# Patient Record
Sex: FEMALE | Race: WHITE | HISPANIC OR LATINO | URBAN - METROPOLITAN AREA
[De-identification: names, ages, dates, MRNs, and addresses within clinical notes are randomized per-mention and may not be internally consistent; named-entity substitution may affect disease eponyms.]

---

## 2017-01-31 ENCOUNTER — EMERGENCY (EMERGENCY)
Facility: HOSPITAL | Age: 27
LOS: 1 days | Discharge: HOME | End: 2017-01-31
Attending: EMERGENCY MEDICINE
Payer: COMMERCIAL

## 2017-01-31 VITALS
SYSTOLIC BLOOD PRESSURE: 123 MMHG | RESPIRATION RATE: 16 BRPM | HEART RATE: 96 BPM | TEMPERATURE: 98 F | DIASTOLIC BLOOD PRESSURE: 79 MMHG | WEIGHT: 164.91 LBS | HEIGHT: 68 IN | OXYGEN SATURATION: 97 %

## 2017-01-31 DIAGNOSIS — Z88.0 ALLERGY STATUS TO PENICILLIN: ICD-10-CM

## 2017-01-31 DIAGNOSIS — Z88.8 ALLERGY STATUS TO OTHER DRUGS, MEDICAMENTS AND BIOLOGICAL SUBSTANCES STATUS: ICD-10-CM

## 2017-01-31 DIAGNOSIS — J11.1 INFLUENZA DUE TO UNIDENTIFIED INFLUENZA VIRUS WITH OTHER RESPIRATORY MANIFESTATIONS: ICD-10-CM

## 2017-01-31 DIAGNOSIS — Z98.82 BREAST IMPLANT STATUS: Chronic | ICD-10-CM

## 2017-01-31 DIAGNOSIS — Z98.82 BREAST IMPLANT STATUS: ICD-10-CM

## 2017-01-31 DIAGNOSIS — M54.6 PAIN IN THORACIC SPINE: ICD-10-CM

## 2017-01-31 PROCEDURE — 71046 X-RAY EXAM CHEST 2 VIEWS: CPT

## 2017-01-31 PROCEDURE — 96372 THER/PROPH/DIAG INJ SC/IM: CPT

## 2017-01-31 PROCEDURE — 93005 ELECTROCARDIOGRAM TRACING: CPT

## 2017-01-31 PROCEDURE — 99283 EMERGENCY DEPT VISIT LOW MDM: CPT | Mod: 25

## 2017-01-31 PROCEDURE — 99284 EMERGENCY DEPT VISIT MOD MDM: CPT

## 2017-01-31 PROCEDURE — 71020: CPT | Mod: 26

## 2017-01-31 RX ORDER — KETOROLAC TROMETHAMINE 30 MG/ML
15 SYRINGE (ML) INJECTION ONCE
Qty: 0 | Refills: 0 | Status: DISCONTINUED | OUTPATIENT
Start: 2017-01-31 | End: 2017-01-31

## 2017-01-31 RX ADMIN — Medication 15 MILLIGRAM(S): at 22:51

## 2017-01-31 RX ADMIN — Medication 15 MILLIGRAM(S): at 21:53

## 2017-01-31 NOTE — ED PROVIDER NOTE - MEDICAL DECISION MAKING DETAILS
PERC negative. Pt w/ chest pain, back pain, cough. Not hypoxic. Evaluate for PNA. Likely MSK related to coughing. Exam not consistent w/ pneumothorax or aortic dissection. PERC negative. Pt w/ chest pain, back pain, cough. Not hypoxic. Evaluate for PNA. Likely MSK related to coughing. Exam not consistent w/ pneumothorax or aortic dissection. Pt advised of qtc, instructed to f/u with her pmd for repeat ecg, instructed to avoid medications which prolong qt. PERC negative. Pt w/ chest pain, back pain, cough. Not hypoxic. Evaluate for PNA. Likely MSK related to coughing. Exam not consistent w/ pneumothorax or aortic dissection. Pt advised of qtc, instructed to f/u with her pmd for repeat ecg, instructed to avoid medications which prolong qt. CXR normal, pt reassessed, feeling better at 10:15pm, calm, no tachypnea, talking in full sentences. I recommneded d/c tamiflu as she started 4 days after sx onset and had side effects, recommneded rest, motrin for pain and oral hydration. Pt instructed to return for difficutly breathing, persistent fever or any other concerning symptoms.

## 2017-01-31 NOTE — ED ADULT TRIAGE NOTE - CHIEF COMPLAINT QUOTE
I have cough, fever, chest congestion not relieved by prescribed medication.  I was diagnosed with the flu yesterday

## 2017-01-31 NOTE — ED PROVIDER NOTE - NS ED MD SCRIBE ATTENDING SCRIBE SECTIONS
HISTORY OF PRESENT ILLNESS/PAST MEDICAL/SURGICAL/SOCIAL HISTORY/HIV/DISPOSITION/VITAL SIGNS( Pullset)/PHYSICAL EXAM/REVIEW OF SYSTEMS

## 2017-01-31 NOTE — ED PROVIDER NOTE - OBJECTIVE STATEMENT
27 y/o F w/ PMHx of ADHD p/w cough onset 4 days ago. Pt went to urgent care yesterday w/ neg X-Ray and was Dx w/ flu. Pt was given Tamiflu and Dayquil today. After taking meds at 17:00, pt began experiencing chest pain, back pain and nausea. Pt denies pregnancy, recent travel, or any other complaint. Allergies: penicillin, Reglan. Pt's flu shot is UTD. Pt has copper IUD in place.

## 2017-01-31 NOTE — ED PROVIDER NOTE - CARE PLAN
Principal Discharge DX:	Acute thoracic back pain, unspecified back pain laterality Principal Discharge DX:	Acute thoracic back pain, unspecified back pain laterality  Secondary Diagnosis:	Influenza

## 2017-02-02 ENCOUNTER — TRANSCRIPTION ENCOUNTER (OUTPATIENT)
Age: 27
End: 2017-02-02

## 2017-06-01 ENCOUNTER — OUTPATIENT (OUTPATIENT)
Dept: OUTPATIENT SERVICES | Facility: HOSPITAL | Age: 27
LOS: 1 days | End: 2017-06-01
Payer: MEDICAID

## 2017-06-01 DIAGNOSIS — Z98.82 BREAST IMPLANT STATUS: Chronic | ICD-10-CM

## 2017-06-27 DIAGNOSIS — R69 ILLNESS, UNSPECIFIED: ICD-10-CM

## 2017-09-01 PROCEDURE — G9001: CPT

## 2018-05-08 ENCOUNTER — APPOINTMENT (OUTPATIENT)
Dept: OBGYN | Facility: CLINIC | Age: 28
End: 2018-05-08

## 2018-07-18 ENCOUNTER — APPOINTMENT (OUTPATIENT)
Dept: OBGYN | Facility: CLINIC | Age: 28
End: 2018-07-18

## 2021-03-29 ENCOUNTER — APPOINTMENT (OUTPATIENT)
Dept: OBGYN | Facility: CLINIC | Age: 31
End: 2021-03-29